# Patient Record
Sex: FEMALE | Race: WHITE
[De-identification: names, ages, dates, MRNs, and addresses within clinical notes are randomized per-mention and may not be internally consistent; named-entity substitution may affect disease eponyms.]

---

## 2023-02-24 ENCOUNTER — HOSPITAL ENCOUNTER (OUTPATIENT)
Dept: HOSPITAL 19 - SDCO | Age: 73
LOS: 1 days | Discharge: HOME | End: 2023-02-25
Attending: ORTHOPAEDIC SURGERY
Payer: MEDICARE

## 2023-02-24 VITALS — TEMPERATURE: 97.2 F | DIASTOLIC BLOOD PRESSURE: 46 MMHG | HEART RATE: 82 BPM | SYSTOLIC BLOOD PRESSURE: 109 MMHG

## 2023-02-24 VITALS — DIASTOLIC BLOOD PRESSURE: 51 MMHG | SYSTOLIC BLOOD PRESSURE: 104 MMHG | HEART RATE: 77 BPM

## 2023-02-24 VITALS — HEART RATE: 68 BPM | SYSTOLIC BLOOD PRESSURE: 110 MMHG | DIASTOLIC BLOOD PRESSURE: 53 MMHG

## 2023-02-24 VITALS — DIASTOLIC BLOOD PRESSURE: 51 MMHG | HEART RATE: 69 BPM | SYSTOLIC BLOOD PRESSURE: 112 MMHG

## 2023-02-24 VITALS — DIASTOLIC BLOOD PRESSURE: 55 MMHG | SYSTOLIC BLOOD PRESSURE: 110 MMHG | HEART RATE: 83 BPM | TEMPERATURE: 98.6 F

## 2023-02-24 VITALS — HEART RATE: 79 BPM | TEMPERATURE: 98.4 F | DIASTOLIC BLOOD PRESSURE: 59 MMHG | SYSTOLIC BLOOD PRESSURE: 111 MMHG

## 2023-02-24 VITALS — SYSTOLIC BLOOD PRESSURE: 108 MMHG | DIASTOLIC BLOOD PRESSURE: 53 MMHG | HEART RATE: 70 BPM

## 2023-02-24 VITALS — SYSTOLIC BLOOD PRESSURE: 147 MMHG | DIASTOLIC BLOOD PRESSURE: 77 MMHG | TEMPERATURE: 97.7 F | HEART RATE: 78 BPM

## 2023-02-24 VITALS — HEART RATE: 78 BPM | SYSTOLIC BLOOD PRESSURE: 119 MMHG | DIASTOLIC BLOOD PRESSURE: 52 MMHG

## 2023-02-24 VITALS — DIASTOLIC BLOOD PRESSURE: 55 MMHG | HEART RATE: 73 BPM | SYSTOLIC BLOOD PRESSURE: 111 MMHG | TEMPERATURE: 98 F

## 2023-02-24 VITALS — TEMPERATURE: 97.6 F

## 2023-02-24 VITALS — HEIGHT: 64.02 IN | BODY MASS INDEX: 16.22 KG/M2 | WEIGHT: 95.02 LBS

## 2023-02-24 VITALS — DIASTOLIC BLOOD PRESSURE: 61 MMHG | HEART RATE: 72 BPM | SYSTOLIC BLOOD PRESSURE: 117 MMHG

## 2023-02-24 DIAGNOSIS — Z79.84: ICD-10-CM

## 2023-02-24 DIAGNOSIS — Z95.828: ICD-10-CM

## 2023-02-24 DIAGNOSIS — M16.12: Primary | ICD-10-CM

## 2023-02-24 DIAGNOSIS — Z79.83: ICD-10-CM

## 2023-02-24 DIAGNOSIS — C85.90: ICD-10-CM

## 2023-02-24 DIAGNOSIS — M81.0: ICD-10-CM

## 2023-02-24 DIAGNOSIS — K21.9: ICD-10-CM

## 2023-02-24 DIAGNOSIS — Z79.899: ICD-10-CM

## 2023-02-24 DIAGNOSIS — E11.9: ICD-10-CM

## 2023-02-24 DIAGNOSIS — I10: ICD-10-CM

## 2023-02-24 DIAGNOSIS — E78.5: ICD-10-CM

## 2023-02-24 PROCEDURE — A9284 NON-ELECTRONIC SPIROMETER: HCPCS

## 2023-02-24 PROCEDURE — C1776 JOINT DEVICE (IMPLANTABLE): HCPCS

## 2023-02-24 PROCEDURE — A4314 CATH W/DRAINAGE 2-WAY LATEX: HCPCS

## 2023-02-24 NOTE — NUR
SHIFT REPORT FROM [facility]. PATIENT IN BED ON ROOM ENTRY. DENIES PAIN. HS
MEDS PER EMAR. AMBULATED IN HALLS X2 WITH SBA. L HIP AQUACELL CDI. DENIES
ADDITIONAL NEEDS. CALL LIGHT IN REACH.

## 2023-02-24 NOTE — NUR
Initial visit; Patient and her family thanked  for looking in on
Patti and offering encouragement and prayer prior to her surgical
procedure.  also offered God's blessings for Patti and a thorough
recovery.

## 2023-02-24 NOTE — NUR
Patient continues to do well. Tolerated dinner tray. Looking forward to
getting up and walking halls again tonight. Report to gisel to resumes cars.

## 2023-02-24 NOTE — NUR
Patient sitting up in bed eating dinner. She has done well post op. Vital
stable on room air. Pain managed with scheduled tylenol. Left hip aquacell
dressing CDI. Teds & Scds. Huang to STEVE. Patient has been up and ambulated the
halls with walker & gaitbelt and her tennis shoes on and she did well. Her
supportive family has been bedside.

## 2023-02-25 VITALS — TEMPERATURE: 98.5 F | HEART RATE: 78 BPM | DIASTOLIC BLOOD PRESSURE: 65 MMHG | SYSTOLIC BLOOD PRESSURE: 133 MMHG

## 2023-02-25 VITALS — SYSTOLIC BLOOD PRESSURE: 124 MMHG | TEMPERATURE: 98.2 F | DIASTOLIC BLOOD PRESSURE: 62 MMHG | HEART RATE: 71 BPM

## 2023-02-25 VITALS — DIASTOLIC BLOOD PRESSURE: 54 MMHG | TEMPERATURE: 98.2 F | SYSTOLIC BLOOD PRESSURE: 113 MMHG | HEART RATE: 71 BPM

## 2023-02-25 LAB
ANION GAP SERPL CALC-SCNC: 8 MMOL/L (ref 7–16)
BUN SERPL-MCNC: 20 MG/DL (ref 10–20)
CALCIUM SERPL-MCNC: 8.3 MG/DL (ref 8.4–10.2)
CHLORIDE SERPL-SCNC: 108 MMOL/L (ref 98–107)
CO2 SERPL-SCNC: 25 MMOL/L (ref 23–31)
CREAT SERPL-SCNC: 0.67 MG/DL (ref 0.57–1.11)
GLUCOSE SERPL-MCNC: 133 MG/DL (ref 70–99)
HCT VFR BLD AUTO: 32.8 % (ref 37–47)
HGB BLD-MCNC: 10.2 G/DL (ref 12.5–16)
POTASSIUM SERPL-SCNC: 3.8 MMOL/L (ref 3.5–4.5)
SODIUM SERPL-SCNC: 141 MMOL/L (ref 136–145)

## 2023-02-25 NOTE — NUR
DISCHARGE INSTRUCTIONS PROVIDED. PATIENT EDUCATION GIVEN. PORTACATH
DEACCESSED AFTER HEPARIN FLUSH ADMINISTERED. FOLLOW UP APPOINTMENTS DISCUSSED.
PATIENT AWARE OF [facility] CALLING ON MONDAY FOR FOLLOW UP AND PT ORDERS.
PATIENT AWARE AND DENIES ANY QUESTIONS OR CONCERNS. MEDICATIONS REVIEWED.
PATIENT ESCORTED OUT VIA WHEELCHAIR.

## 2023-02-25 NOTE — NUR
JAEGER CATHETER DISCONTINUED. 10 MLS REMOVED FROM BALLOON. TOLERATED PROCEDURE.
HAT IN TOILET FOR NEXT VOID.

## 2024-10-22 ENCOUNTER — HOSPITAL ENCOUNTER (OUTPATIENT)
Dept: HOSPITAL 19 - COL.RAD | Age: 74
End: 2024-10-22
Payer: MEDICARE

## 2024-10-22 DIAGNOSIS — C83.38: Primary | ICD-10-CM

## 2024-10-22 DIAGNOSIS — E04.1: ICD-10-CM
